# Patient Record
Sex: MALE | Race: WHITE | Employment: FULL TIME
[De-identification: names, ages, dates, MRNs, and addresses within clinical notes are randomized per-mention and may not be internally consistent; named-entity substitution may affect disease eponyms.]

---

## 2019-09-27 ENCOUNTER — HEALTH MAINTENANCE LETTER (OUTPATIENT)
Age: 24
End: 2019-09-27

## 2021-01-09 ENCOUNTER — HEALTH MAINTENANCE LETTER (OUTPATIENT)
Age: 26
End: 2021-01-09

## 2021-10-23 ENCOUNTER — HEALTH MAINTENANCE LETTER (OUTPATIENT)
Age: 26
End: 2021-10-23

## 2022-02-12 ENCOUNTER — HEALTH MAINTENANCE LETTER (OUTPATIENT)
Age: 27
End: 2022-02-12

## 2022-07-06 ENCOUNTER — OFFICE VISIT (OUTPATIENT)
Dept: FAMILY MEDICINE | Facility: CLINIC | Age: 27
End: 2022-07-06

## 2022-07-06 VITALS
BODY MASS INDEX: 26.64 KG/M2 | WEIGHT: 201 LBS | OXYGEN SATURATION: 97 % | HEIGHT: 73 IN | TEMPERATURE: 98 F | DIASTOLIC BLOOD PRESSURE: 82 MMHG | HEART RATE: 85 BPM | SYSTOLIC BLOOD PRESSURE: 130 MMHG

## 2022-07-06 DIAGNOSIS — R07.0 THROAT PAIN: Primary | ICD-10-CM

## 2022-07-06 DIAGNOSIS — J06.9 UPPER RESPIRATORY TRACT INFECTION, UNSPECIFIED TYPE: ICD-10-CM

## 2022-07-06 LAB
COVID-19: NEGATIVE
STREP A: NEGATIVE

## 2022-07-06 PROCEDURE — 87635 SARS-COV-2 COVID-19 AMP PRB: CPT | Performed by: PHYSICIAN ASSISTANT

## 2022-07-06 PROCEDURE — G2023 SPECIMEN COLLECT COVID-19: HCPCS | Performed by: PHYSICIAN ASSISTANT

## 2022-07-06 PROCEDURE — 99203 OFFICE O/P NEW LOW 30 MIN: CPT | Performed by: PHYSICIAN ASSISTANT

## 2022-07-06 PROCEDURE — 87651 STREP A DNA AMP PROBE: CPT | Performed by: PHYSICIAN ASSISTANT

## 2022-07-06 NOTE — NURSING NOTE
Chief Complaint   Patient presents with     Establish Care     Re-establish care      Pharyngitis     Throat pain started last Friday, worse at night, painful swallowing, NEG COVID test this morning at home      Pre-visit Screening:  Immunizations:  up to date  Colonoscopy:  NA  Mammogram: NA  Asthma Action Test/Plan:  NA  PHQ9:  NA  GAD7:  NA  Questioned patient about current smoking habits Pt. has never smoked.  Ok to leave detailed message on voice mail for today's visit only Yes, phone # [phone]

## 2022-07-06 NOTE — PROGRESS NOTES
"  Assessment & Plan     Throat pain  - Strep A ([facility])  - COVID-19 ([facility])    Upper respiratory tract infection, unspecified type-negative for COVID and strep A, suspect viral cause or possibly allergies. Will treat conservatively and await improvement  Honey, Chloraseptic spray, ibuprofen, Tylenol, salt water gargles to help throat pain  Rest and increase hydration  Consider Flonase if symptoms do not improve in 5-7 days     Follow up if symptoms change or worsen    No follow-ups on file.    Rod Yañez, MARIELENA  [facility]    Miguel Mireles is a 26 year old, presenting for the following health issues:  Establish Care (Re-establish care ) and Pharyngitis (Throat pain started last Friday, worse at night, painful swallowing, NEG COVID test this morning at home )      HPI     No seasonal allergy history.  History of strep throat as a child  Some eye discharge noted as well with this.    Acute Illness  Acute illness concerns: Sore throat, hard to swallow  Onset/Duration: 5 days, worse at night  Symptoms:  Fever: No  Chills/Sweats: No  Headache (location?): No  Sinus Pressure: No  Conjunctivitis:  YES  Ear Pain: no  Rhinorrhea: No  Congestion: No  Sore Throat: YES  Cough: no  Wheeze: No  Decreased Appetite: No  Nausea: No  Vomiting: No  Diarrhea: No  Dysuria/Freq.: No  Dysuria or Hematuria: No  Fatigue/Achiness: No  Sick/Strep Exposure: No  Therapies tried and outcome: Nothing      Review of Systems   Constitutional, HEENT, cardiovascular, pulmonary, gi and gu systems are negative, except as otherwise noted.      Objective    /82 (BP Location: Right arm, Patient Position: Sitting, Cuff Size: Adult Large)   Pulse 85   Temp 98  F (36.7  C) (Temporal)   Ht 1.854 m (6' 1\")   Wt 91.2 kg (201 lb)   SpO2 97%   BMI 26.52 kg/m    Body mass index is 26.52 kg/m .  Physical Exam   GENERAL: healthy, alert and no distress  HENT: erythematous oropharynx and nasal mucosa, ear canals and TM's normal, nose and " mouth without ulcers or lesions  NECK: no adenopathy, no asymmetry, masses, or scars and thyroid normal to palpation  RESP: lungs clear to auscultation - no rales, rhonchi or wheezes  CV: regular rate and rhythm, normal S1 S2, no S3 or S4, no murmur, click or rub, no peripheral edema and peripheral pulses strong  ABDOMEN: soft, nontender, no hepatosplenomegaly, no masses and bowel sounds normal  MS: no gross musculoskeletal defects noted, no edema  NEURO: Normal strength and tone, mentation intact and speech normal  Normal, some erythema of oropharynx and nasal mucosa      Results for orders placed or performed in visit on 07/06/22 (from the past 24 hour(s))   Strep A (B[facility])   Result Value Ref Range    STREP A Negative Negative   COVID-19 ([facility])   Result Value Ref Range    COVID-19 Negative                    .  ..

## 2022-07-18 ENCOUNTER — OFFICE VISIT (OUTPATIENT)
Dept: FAMILY MEDICINE | Facility: CLINIC | Age: 27
End: 2022-07-18

## 2022-07-18 VITALS
BODY MASS INDEX: 26.9 KG/M2 | DIASTOLIC BLOOD PRESSURE: 82 MMHG | HEIGHT: 73 IN | OXYGEN SATURATION: 99 % | RESPIRATION RATE: 22 BRPM | WEIGHT: 203 LBS | TEMPERATURE: 98 F | SYSTOLIC BLOOD PRESSURE: 134 MMHG | HEART RATE: 90 BPM

## 2022-07-18 DIAGNOSIS — Z00.00 ROUTINE HISTORY AND PHYSICAL EXAMINATION OF ADULT: Primary | ICD-10-CM

## 2022-07-18 DIAGNOSIS — S43.002S SHOULDER SUBLUXATION, LEFT, SEQUELA: ICD-10-CM

## 2022-07-18 DIAGNOSIS — G89.29 CHRONIC LEFT SHOULDER PAIN: ICD-10-CM

## 2022-07-18 DIAGNOSIS — M25.512 CHRONIC LEFT SHOULDER PAIN: ICD-10-CM

## 2022-07-18 DIAGNOSIS — Z13.220 LIPID SCREENING: ICD-10-CM

## 2022-07-18 DIAGNOSIS — Z13.1 SCREENING FOR DIABETES MELLITUS: ICD-10-CM

## 2022-07-18 LAB
CHOLEST SERPL-MCNC: 201 MG/DL (ref 0–199)
CHOLEST/HDLC SERPL: 3 {RATIO} (ref 0–5)
HBA1C MFR BLD: 4.7 % (ref 4–7)
HDLC SERPL-MCNC: 61 MG/DL (ref 40–150)
LDLC SERPL CALC-MCNC: 111 MG/DL (ref 0–130)
TRIGL SERPL-MCNC: 146 MG/DL (ref 0–149)

## 2022-07-18 PROCEDURE — 99213 OFFICE O/P EST LOW 20 MIN: CPT | Mod: 25 | Performed by: STUDENT IN AN ORGANIZED HEALTH CARE EDUCATION/TRAINING PROGRAM

## 2022-07-18 PROCEDURE — 36415 COLL VENOUS BLD VENIPUNCTURE: CPT | Performed by: STUDENT IN AN ORGANIZED HEALTH CARE EDUCATION/TRAINING PROGRAM

## 2022-07-18 PROCEDURE — 80061 LIPID PANEL: CPT | Performed by: STUDENT IN AN ORGANIZED HEALTH CARE EDUCATION/TRAINING PROGRAM

## 2022-07-18 PROCEDURE — 83036 HEMOGLOBIN GLYCOSYLATED A1C: CPT | Performed by: STUDENT IN AN ORGANIZED HEALTH CARE EDUCATION/TRAINING PROGRAM

## 2022-07-18 PROCEDURE — 73030 X-RAY EXAM OF SHOULDER: CPT | Mod: LT | Performed by: STUDENT IN AN ORGANIZED HEALTH CARE EDUCATION/TRAINING PROGRAM

## 2022-07-18 PROCEDURE — 99395 PREV VISIT EST AGE 18-39: CPT | Mod: 25 | Performed by: STUDENT IN AN ORGANIZED HEALTH CARE EDUCATION/TRAINING PROGRAM

## 2022-07-18 NOTE — PATIENT INSTRUCTIONS
Await labs    Let me know if any moles are changing    Your shoulder symptoms seem to be from recurrent subluxations (partial dislocation) associated with cartilage damage. Recommend PT as best treatment, and trying to avoid vulnerable positions. Can get MRI if not improving or new concerns.

## 2022-07-18 NOTE — NURSING NOTE
Chief Complaint   Patient presents with     Physical     Annual, non fasting      Pre-visit Screening:  Immunizations:  up to date  Colonoscopy:  NA  Mammogram: NA  Asthma Action Test/Plan:  NA  PHQ9:  NA  GAD7:  NA  Questioned patient about current smoking habits Pt. has never smoked.  Ok to leave detailed message on voice mail for today's visit only Yes, phone # [phone]

## 2022-07-18 NOTE — PROGRESS NOTES
3  SUBJECTIVE:   CC: Chi Lopez is an 26 year old male who presents for preventive health visit.     Patient has been advised of split billing requirements and indicates understanding: Yes     Healthy Habits:  General health: good  Exercise: runs marathons, active  Sleep: no concerns     Mental Health: no concerns        Problems taking medications regularly N/A    Do you see a dentist twice per year? yes    Do you have sleep apnea, excessive snoring or daytime drowsiness?no    Musculoskeletal problem/pain      Duration: 2 years. Initially injury while swimming [place] stroke and had sharp pain with arm abducted. Was really sore for a few weeks.     Description  Location: L shoulder. Has continued to have recurrent episodes of pain when reaching back/overhead. No overt dislocations, no trauma or falls. RHD.       Today's PHQ-2 Score:   PHQ-2 ( 1999 [employer]) 7/6/2022   Q1: Little interest or pleasure in doing things 0   Q2: Feeling down, depressed or hopeless 0   PHQ-2 Score 0     Do you feel safe in your environment? Yes    Have you ever done Advance Care Planning? (For example, a Health Directive, POLST, or a discussion with a medical provider or your loved ones about your wishes): No, advance care planning information given to patient to review.  Advanced care planning was discussed at today's visit.    Social History     Tobacco Use     Smoking status: Never Smoker     Smokeless tobacco: Never Used   Substance Use Topics     Alcohol use: Yes     Comment: occasionally     If you drink alcohol do you typically have >3 drinks per day or >7 drinks per week? No                      Last PSA: No results found for: PSA    Reviewed orders with patient. Reviewed health maintenance and updated orders accordingly - Yes  BP Readings from Last 3 Encounters:   07/18/22 134/82   07/06/22 130/82   05/17/16 128/72    Wt Readings from Last 3 Encounters:   07/18/22 92.1 kg (203 lb)   07/06/22 91.2 kg (201 lb)   05/17/16 98.4 kg  "(217 lb)                  Patient Active Problem List   Diagnosis     Health Care Home     Ganglion cyst     Past Surgical History:   Procedure Laterality Date     ADENOIDECTOMY N/A 05/17/2016    Procedure: ADENOIDECTOMY;  Surgeon: Clemente Cotter MD;  Location: [facility]     PE TUBES       SEPTORHINOPLASTY N/A 05/17/2016    Procedure: SEPTORHINOPLASTY;  Surgeon: Clemente Cotter MD;  Location: [facility]       Social History     Tobacco Use     Smoking status: Never Smoker     Smokeless tobacco: Never Used   Substance Use Topics     Alcohol use: Yes     Comment: occasionally     Family History   Problem Relation Age of Onset     Heart Surgery Mother         ablation     No Known Problems Father      No Known Problems Brother      Cancer Maternal Grandmother         unknown     Diabetes Maternal Grandfather      Breast Cancer Paternal Grandmother         92 yo     Coronary Artery Disease No family hx of      Cerebrovascular Disease No family hx of      Colon Cancer No family hx of      Prostate Cancer No family hx of            Reviewed and updated as needed this visit by clinical staff   Tobacco  Allergies  Meds                Reviewed and updated as needed this visit by Provider                     ROS:  12 point ROS performed and negative for new concerns except as mentioned above     OBJECTIVE:   /82 (BP Location: Left arm, Patient Position: Sitting, Cuff Size: Adult Large)   Pulse 90   Temp 98  F (36.7  C) (Temporal)   Resp 22   Ht 1.854 m (6' 1\")   Wt 92.1 kg (203 lb)   SpO2 99%   BMI 26.78 kg/m    EXAM:  GENERAL: healthy, alert and no distress  EYES: Eyes grossly normal to inspection, PERRL and conjunctivae and sclerae normal  HENT: ear canals and TM's normal, nose and mouth without ulcers or lesions  NECK: no adenopathy, no asymmetry, masses, or scars and thyroid normal to palpation  RESP: lungs clear to auscultation - no rales, rhonchi or wheezes  CV: regular rate and rhythm, normal S1 S2, no S3 " or S4, no murmur, click or rub, no peripheral edema and peripheral pulses strong  ABDOMEN: soft, nontender, no hepatosplenomegaly, no masses and bowel sounds normal  SKIN: no suspicious lesions or rashes  NEURO: Normal strength and tone, mentation intact and speech normal  PSYCH: mentation appears normal, affect normal/bright    L shoulder:   Full and symm /160/70/T4  No focal TTP  5/5 str abd, ER, IR  Neg empty can  Neg Speed  Neg impingement signs  Neg Obriens  Positive apprehension and relocation  CMS intact distally       Diagnostic Test Results:  Labs reviewed in Epic  XR Shoulder Left G/E 3 Views    Result Date: 2022  [facility] Phone: [phone] * Fax: [phone] [address], [place] [address]  [address] [place] [address] Age: 26 Y Dept No.: [id number] GREGORIO CORBETT : 1995 Chart # Gender: M Req. Phys: Toby Salinas MD Clinic MRN: Clinic: [facility] Acc#: [id number] Exam: SHOULDER 3 VIEWS / LT Exam Date: 2022 EXAM: SHOULDER 3 VIEWS LEFT LOCATION: [facility] DATE/TIME: 2022 3:32 PM INDICATION: Chronic left shoulder pain. COMPARISON: None. IMPRESSION: The left glenohumeral and acromiohumeral joints are negative for fracture or dislocation. Performed by: RACHEAL Transcribed By: BRODIE on: 2022 4:41 PM CDT Finalized By: KALLIE CAMPUZANO M.D. on: 2022 4:41 PM CDT Dictated By: KALLIE CAMPUZANO M.D. Signed by: DEE Page 1 of 1     ASSESSMENT/PLAN:       ICD-10-CM    1. Routine history and physical examination of adult  Z00.00    2. Chronic left shoulder pain  M25.512 XR Shoulder Left G/E 3 Views    G89.29 Physical Therapy Referral   3. Screening for diabetes mellitus  Z13.1 HEMOGLOBIN A1C (BFP)   4. Lipid screening  Z13.220 Lipid Panel ([facility])   5. Shoulder subluxation, left, sequela  S43.002S Physical Therapy Referral     COUNSELING:  Reviewed preventive health counseling,  "as reflected in patient instructions       Regular exercise       Healthy diet/nutrition       Vision screening       Hearing screening       Immunizations       Alcohol Use        Safe sex practices/STD prevention - declined screening       Colorectal cancer screening       Prostate cancer screening    Estimated body mass index is 26.78 kg/m  as calculated from the following:    Height as of this encounter: 1.854 m (6' 1\").    Weight as of this encounter: 92.1 kg (203 lb).    Weight management plan: Discussed healthy diet and exercise guidelines    He reports that he has never smoked. He has never used smokeless tobacco.      Patient has been advised of split billing requirements and indicates understanding: Yes    Other issues addressed today (15 min)  L shoulder pain: hx and exam most suggestive of initial subluxation event with ongoing micro-instability without any hx of overt dislocation. XRs obtained and reviewed with patient, demonstrating no DJD or bony abnormality. Discussed options for further eval but recommend PT for shoulder stability. Referral in.      Toby Salinas MD, [facility]   "

## 2022-10-09 ENCOUNTER — HEALTH MAINTENANCE LETTER (OUTPATIENT)
Age: 27
End: 2022-10-09

## 2023-08-19 ENCOUNTER — HEALTH MAINTENANCE LETTER (OUTPATIENT)
Age: 28
End: 2023-08-19

## 2024-10-12 ENCOUNTER — HEALTH MAINTENANCE LETTER (OUTPATIENT)
Age: 29
End: 2024-10-12